# Patient Record
Sex: FEMALE | Race: BLACK OR AFRICAN AMERICAN | Employment: UNEMPLOYED | URBAN - METROPOLITAN AREA
[De-identification: names, ages, dates, MRNs, and addresses within clinical notes are randomized per-mention and may not be internally consistent; named-entity substitution may affect disease eponyms.]

---

## 2021-02-18 ENCOUNTER — HOSPITAL ENCOUNTER (EMERGENCY)
Age: 21
Discharge: HOME | End: 2021-02-18
Attending: EMERGENCY MEDICINE
Payer: OTHER GOVERNMENT

## 2021-02-18 ENCOUNTER — APPOINTMENT (OUTPATIENT)
Dept: GENERAL RADIOLOGY | Age: 21
End: 2021-02-18
Attending: PHYSICIAN ASSISTANT
Payer: OTHER GOVERNMENT

## 2021-02-18 VITALS
BODY MASS INDEX: 28.32 KG/M2 | RESPIRATION RATE: 16 BRPM | HEIGHT: 65 IN | WEIGHT: 170 LBS | HEART RATE: 89 BPM | DIASTOLIC BLOOD PRESSURE: 71 MMHG | OXYGEN SATURATION: 98 % | SYSTOLIC BLOOD PRESSURE: 114 MMHG | TEMPERATURE: 98 F

## 2021-02-18 DIAGNOSIS — M25.559 HIP PAIN: Primary | ICD-10-CM

## 2021-02-18 PROCEDURE — 73502 X-RAY EXAM HIP UNI 2-3 VIEWS: CPT

## 2021-02-18 PROCEDURE — 99283 EMERGENCY DEPT VISIT LOW MDM: CPT

## 2021-02-18 RX ORDER — MELOXICAM 7.5 MG/1
7.5 TABLET ORAL DAILY
Qty: 30 TAB | Refills: 0 | Status: SHIPPED | OUTPATIENT
Start: 2021-02-18 | End: 2021-03-20

## 2021-02-18 NOTE — ED PROVIDER NOTES
Pt has stress fracture to left hip 3 years ago,had  orif in [facility], pt c/o continued pain daily,no new trauma, moved here in [place] from [address] [place] [address],[address]. No local md yet, has not been to local [address] [place] [address]     The history is provided by the patient. Leg Pain   This is a chronic problem. Episode onset: 3 years  The problem occurs constantly. The problem has been gradually worsening. The pain is present in the left hip. The quality of the pain is described as aching. The pain is at a severity of 10/10. The pain is mild. Associated symptoms include stiffness. The symptoms are aggravated by activity and palpation. She has tried OTC pain medications for the symptoms. The treatment provided mild relief. There has been a history of trauma. No past medical history on file. No past surgical history on file. No family history on file.     Social History     Socioeconomic History    Marital status: Not on file     Spouse name: Not on file    Number of children: Not on file    Years of education: Not on file    Highest education level: Not on file   Occupational History    Not on file   Social Needs    Financial resource strain: Not on file    Food insecurity     Worry: Not on file     Inability: Not on file    Transportation needs     Medical: Not on file     Non-medical: Not on file   Tobacco Use    Smoking status: Not on file   Substance and Sexual Activity    Alcohol use: Not on file    Drug use: Not on file    Sexual activity: Not on file   Lifestyle    Physical activity     Days per week: Not on file     Minutes per session: Not on file    Stress: Not on file   Relationships    Social connections     Talks on phone: Not on file     Gets together: Not on file     Attends Judaism service: Not on file     Active member of club or organization: Not on file     Attends meetings of clubs or organizations: Not on file     Relationship status: Not on file    Intimate partner violence     Fear of current or ex partner: Not on file     Emotionally abused: Not on file     Physically abused: Not on file     Forced sexual activity: Not on file   Other Topics Concern    Not on file   Social History Narrative    Not on file         ALLERGIES: Patient has no known allergies. Review of Systems   Musculoskeletal: Positive for stiffness. All other systems reviewed and are negative. Vitals:    02/18/21 1425   BP: 119/79   Pulse: 93   Resp: 16   Temp: 98 °F (36.7 °C)   SpO2: 98%   Weight: 77.1 kg (170 lb)   Height: 5' 5\" (1.651 m)            Physical Exam  Vitals signs and nursing note reviewed. Constitutional:       General: She is not in acute distress. Appearance: Normal appearance. She is well-developed and normal weight. She is not diaphoretic. HENT:      Head: Normocephalic and atraumatic. Eyes:      Pupils: Pupils are equal, round, and reactive to light. Neck:      Musculoskeletal: Normal range of motion and neck supple. Cardiovascular:      Rate and Rhythm: Normal rate and regular rhythm. Pulmonary:      Effort: Pulmonary effort is normal.      Breath sounds: Normal breath sounds. Abdominal:      General: Bowel sounds are normal.      Palpations: Abdomen is soft. Musculoskeletal: Normal range of motion. General: Tenderness present. No swelling or deformity. Comments: Pain throughout the lateral left hip area. Patient will not flex the left knee due to discomfort. On exam she will not relax enough to do active or passive range of motions when she walks use the cane and walks with a very stiff hip and knee. No swelling to the leg calf is soft   Skin:     General: Skin is warm. Neurological:      Mental Status: She is alert and oriented to person, place, and time. MDM  Number of Diagnoses or Management Options  Diagnosis management comments: Left X-ray shows intact hardware no signs of any loosening or fracture. Pain 3 years status post ORIF.   I see no acute process at this time and feel patient may benefit from working on strengthening and range of motion of the of the hip she will follow-up with the [facility], will give Mobic daily for pain       Amount and/or Complexity of Data Reviewed  Tests in the radiology section of CPT®: ordered and reviewed    Risk of Complications, Morbidity, and/or Mortality  Presenting problems: low  Diagnostic procedures: low  Management options: low    Patient Progress  Patient progress: improved         Procedures

## 2021-02-18 NOTE — DISCHARGE INSTRUCTIONS
Use meds as Directed along with warm compresses or heat patches work on range of motion of the.  Call office to arrange follow-up appointment

## 2021-02-18 NOTE — ED TRIAGE NOTES
Pt c/o L leg pain for about 4 years, states she has pins in her hip and the pain flares up from time to time.

## 2021-02-18 NOTE — ED NOTES
I have reviewed discharge instructions with the patient. The patient verbalized understanding. Patient left ED via Discharge Method: ambulatory to Home with (self). Opportunity for questions and clarification provided. Patient given 1 scripts. To continue your aftercare when you leave the hospital, you may receive an automated call from our care team to check in on how you are doing. This is a free service and part of our promise to provide the best care and service to meet your aftercare needs.  If you have questions, or wish to unsubscribe from this service please call [phone]. Thank you for Choosing our [address] [place] [address] Emergency Department.

## 2021-07-14 ENCOUNTER — HOSPITAL ENCOUNTER (EMERGENCY)
Age: 21
Discharge: HOME | End: 2021-07-14
Attending: EMERGENCY MEDICINE
Payer: OTHER GOVERNMENT

## 2021-07-14 VITALS
WEIGHT: 150 LBS | SYSTOLIC BLOOD PRESSURE: 112 MMHG | BODY MASS INDEX: 24.99 KG/M2 | OXYGEN SATURATION: 100 % | HEIGHT: 65 IN | HEART RATE: 66 BPM | TEMPERATURE: 99.1 F | RESPIRATION RATE: 18 BRPM | DIASTOLIC BLOOD PRESSURE: 63 MMHG

## 2021-07-14 DIAGNOSIS — N30.00 ACUTE CYSTITIS WITHOUT HEMATURIA: ICD-10-CM

## 2021-07-14 DIAGNOSIS — R10.84 GENERALIZED ABDOMINAL CRAMPS: Primary | ICD-10-CM

## 2021-07-14 DIAGNOSIS — E86.9 VOLUME DEPLETION: ICD-10-CM

## 2021-07-14 DIAGNOSIS — R11.2 NON-INTRACTABLE VOMITING WITH NAUSEA, UNSPECIFIED VOMITING TYPE: ICD-10-CM

## 2021-07-14 LAB
ALBUMIN SERPL-MCNC: 4.1 G/DL (ref 3.5–5)
ALBUMIN/GLOB SERPL: 0.9 {RATIO} (ref 1.2–3.5)
ALP SERPL-CCNC: 108 U/L (ref 50–136)
ALT SERPL-CCNC: 21 U/L (ref 12–65)
ANION GAP SERPL CALC-SCNC: 7 MMOL/L (ref 7–16)
AST SERPL-CCNC: 11 U/L (ref 15–37)
BACTERIA URNS QL MICRO: NORMAL /HPF
BASOPHILS # BLD: 0.1 K/UL (ref 0–0.2)
BASOPHILS NFR BLD: 1 % (ref 0–2)
BILIRUB SERPL-MCNC: 0.4 MG/DL (ref 0.2–1.1)
BUN SERPL-MCNC: 11 MG/DL (ref 6–23)
CALCIUM SERPL-MCNC: 9.8 MG/DL (ref 8.3–10.4)
CASTS URNS QL MICRO: NORMAL /LPF
CHLORIDE SERPL-SCNC: 103 MMOL/L (ref 98–107)
CO2 SERPL-SCNC: 27 MMOL/L (ref 21–32)
CREAT SERPL-MCNC: 0.97 MG/DL (ref 0.6–1)
DIFFERENTIAL METHOD BLD: ABNORMAL
EOSINOPHIL # BLD: 0.1 K/UL (ref 0–0.8)
EOSINOPHIL NFR BLD: 1 % (ref 0.5–7.8)
EPI CELLS #/AREA URNS HPF: NORMAL /HPF
ERYTHROCYTE [DISTWIDTH] IN BLOOD BY AUTOMATED COUNT: 13 % (ref 11.9–14.6)
GLOBULIN SER CALC-MCNC: 4.4 G/DL (ref 2.3–3.5)
GLUCOSE SERPL-MCNC: 82 MG/DL (ref 65–100)
HCG UR QL: NEGATIVE
HCT VFR BLD AUTO: 43.5 % (ref 35.8–46.3)
HGB BLD-MCNC: 15.2 G/DL (ref 11.7–15.4)
IMM GRANULOCYTES # BLD AUTO: 0 K/UL (ref 0–0.5)
IMM GRANULOCYTES NFR BLD AUTO: 0 % (ref 0–5)
LIPASE SERPL-CCNC: 98 U/L (ref 73–393)
LYMPHOCYTES # BLD: 2.4 K/UL (ref 0.5–4.6)
LYMPHOCYTES NFR BLD: 41 % (ref 13–44)
MCH RBC QN AUTO: 30.6 PG (ref 26.1–32.9)
MCHC RBC AUTO-ENTMCNC: 34.9 G/DL (ref 31.4–35)
MCV RBC AUTO: 87.5 FL (ref 79.6–97.8)
MONOCYTES # BLD: 0.5 K/UL (ref 0.1–1.3)
MONOCYTES NFR BLD: 9 % (ref 4–12)
NEUTS SEG # BLD: 2.9 K/UL (ref 1.7–8.2)
NEUTS SEG NFR BLD: 48 % (ref 43–78)
NRBC # BLD: 0 K/UL (ref 0–0.2)
PLATELET # BLD AUTO: 418 K/UL (ref 150–450)
PMV BLD AUTO: 8.7 FL (ref 9.4–12.3)
POTASSIUM SERPL-SCNC: 4.2 MMOL/L (ref 3.5–5.1)
PROT SERPL-MCNC: 8.5 G/DL (ref 6.3–8.2)
RBC # BLD AUTO: 4.97 M/UL (ref 4.05–5.2)
RBC #/AREA URNS HPF: NORMAL /HPF
SODIUM SERPL-SCNC: 137 MMOL/L (ref 136–145)
WBC # BLD AUTO: 5.9 K/UL (ref 4.3–11.1)
WBC URNS QL MICRO: NORMAL /HPF

## 2021-07-14 PROCEDURE — 99283 EMERGENCY DEPT VISIT LOW MDM: CPT

## 2021-07-14 PROCEDURE — 96374 THER/PROPH/DIAG INJ IV PUSH: CPT

## 2021-07-14 PROCEDURE — 80053 COMPREHEN METABOLIC PANEL: CPT

## 2021-07-14 PROCEDURE — 81025 URINE PREGNANCY TEST: CPT

## 2021-07-14 PROCEDURE — 81003 URINALYSIS AUTO W/O SCOPE: CPT

## 2021-07-14 PROCEDURE — 96375 TX/PRO/DX INJ NEW DRUG ADDON: CPT

## 2021-07-14 PROCEDURE — 96361 HYDRATE IV INFUSION ADD-ON: CPT

## 2021-07-14 PROCEDURE — 74011250636 HC RX REV CODE- 250/636: Performed by: EMERGENCY MEDICINE

## 2021-07-14 PROCEDURE — 85025 COMPLETE CBC W/AUTO DIFF WBC: CPT

## 2021-07-14 PROCEDURE — 83690 ASSAY OF LIPASE: CPT

## 2021-07-14 PROCEDURE — 81015 MICROSCOPIC EXAM OF URINE: CPT

## 2021-07-14 RX ORDER — ONDANSETRON 2 MG/ML
8 INJECTION INTRAMUSCULAR; INTRAVENOUS
Status: COMPLETED | OUTPATIENT
Start: 2021-07-14 | End: 2021-07-14

## 2021-07-14 RX ORDER — KETOROLAC TROMETHAMINE 30 MG/ML
30 INJECTION, SOLUTION INTRAMUSCULAR; INTRAVENOUS
Status: COMPLETED | OUTPATIENT
Start: 2021-07-14 | End: 2021-07-14

## 2021-07-14 RX ORDER — SODIUM CHLORIDE 0.9 % (FLUSH) 0.9 %
5-10 SYRINGE (ML) INJECTION EVERY 8 HOURS
Status: DISCONTINUED | OUTPATIENT
Start: 2021-07-14 | End: 2021-07-14 | Stop reason: HOSPADM

## 2021-07-14 RX ORDER — ONDANSETRON 8 MG/1
8 TABLET, ORALLY DISINTEGRATING ORAL
Qty: 20 TABLET | Refills: 0 | Status: SHIPPED | OUTPATIENT
Start: 2021-07-14

## 2021-07-14 RX ORDER — HYOSCYAMINE SULFATE 0.12 MG/1
0.12 TABLET SUBLINGUAL
Qty: 30 TABLET | Refills: 0 | Status: SHIPPED | OUTPATIENT
Start: 2021-07-14

## 2021-07-14 RX ORDER — CEPHALEXIN 500 MG/1
500 CAPSULE ORAL 3 TIMES DAILY
Qty: 21 CAPSULE | Refills: 0 | Status: SHIPPED | OUTPATIENT
Start: 2021-07-14 | End: 2021-07-21

## 2021-07-14 RX ORDER — SODIUM CHLORIDE 0.9 % (FLUSH) 0.9 %
5-10 SYRINGE (ML) INJECTION AS NEEDED
Status: DISCONTINUED | OUTPATIENT
Start: 2021-07-14 | End: 2021-07-14 | Stop reason: HOSPADM

## 2021-07-14 RX ADMIN — SODIUM CHLORIDE 1000 ML: 900 INJECTION, SOLUTION INTRAVENOUS at 17:50

## 2021-07-14 RX ADMIN — ONDANSETRON 8 MG: 2 INJECTION INTRAMUSCULAR; INTRAVENOUS at 17:49

## 2021-07-14 RX ADMIN — KETOROLAC TROMETHAMINE 30 MG: 30 INJECTION, SOLUTION INTRAMUSCULAR; INTRAVENOUS at 17:49

## 2021-07-14 NOTE — DISCHARGE INSTRUCTIONS
Drink plenty of fluids. Take medications as prescribed. Return to the emergency department for any concerns.

## 2021-07-14 NOTE — ED PROVIDER NOTES
Soheila Jeronimo is a 24 y.o. female seen on 7/14/2021 in the [address] [place] [address] in room HG/G. Chief Complaint   Patient presents with    Flank Pain     HPI: 43-year-old -American female presented to the emergency department with complaints of generalized abdominal cramping for the past 4 days. Patient was seen at urgent care earlier today and was sent to the emergency department for either an ultrasound or CT scan. patient reports vomiting 3 times on Monday (2 days ago). Patient's last bowel movement was yesterday. She states that she did not have a normal bowel movement. She has had mild intermittent abdominal cramping and nausea. She denies any fevers, chills, chest pain or shortness of breath. Patient has had mild increased frequency of urination. She denies any pelvic pain, vaginal discharge, vaginal bleeding or any other concerns. Historian: Patient    REVIEW OF SYSTEMS     Review of Systems   Constitutional: Negative. HENT: Negative. Respiratory: Negative. Cardiovascular: Negative. Gastrointestinal: Positive for abdominal pain, constipation, nausea and vomiting. Genitourinary: Positive for flank pain and frequency. Skin: Negative. Neurological: Negative. Psychiatric/Behavioral: Negative. All other systems reviewed and are negative. PAST MEDICAL HISTORY     History reviewed. No pertinent past medical history.   Past Surgical History:   Procedure Laterality Date    HX ORTHOPAEDIC      VT BREAST SURGERY PROCEDURE UNLISTED       Social History     Socioeconomic History    Marital status:      Spouse name: Not on file    Number of children: Not on file    Years of education: Not on file    Highest education level: Not on file   Tobacco Use    Smoking status: Never Smoker    Smokeless tobacco: Never Used   Substance and Sexual Activity    Alcohol use: Yes     Comment: occ    Drug use: Never Social Determinants of Health     Financial Resource Strain:     Difficulty of Paying Living Expenses:    Food Insecurity:     Worried About Running Out of Food in the Last Year:     [address] in the Last Year:    Transportation Needs:     Lack of Transportation (Medical):  Lack of Transportation (Non-Medical):    Physical Activity:     Days of Exercise per Week:     Minutes of Exercise per Session:    Stress:     Feeling of Stress :    Social Connections:     Frequency of Communication with Friends and Family:     Frequency of Social Gatherings with Friends and Family:     Attends Roman Catholic Services:     Active Member of Clubs or Organizations:     Attends Club or Organization Meetings:     Marital Status:      None     No Known Allergies     PHYSICAL EXAM       Vitals:    07/14/21 1624   BP: 115/70   Pulse: 66   Resp: 18   Temp: 99.1 °F (37.3 °C)   SpO2: 100%    Vital signs were reviewed. Physical Exam  Vitals and nursing note reviewed. Constitutional:       Appearance: Normal appearance. HENT:      Head: Normocephalic and atraumatic. Mouth/Throat:      Mouth: Mucous membranes are moist.   Eyes:      Extraocular Movements: Extraocular movements intact. Pupils: Pupils are equal, round, and reactive to light. Cardiovascular:      Rate and Rhythm: Normal rate and regular rhythm. Pulses: Normal pulses. Heart sounds: Normal heart sounds. Pulmonary:      Effort: Pulmonary effort is normal.      Breath sounds: Normal breath sounds. Abdominal:      Palpations: Abdomen is soft. Tenderness: There is abdominal tenderness (Nonfocal abdominal tenderness with generalized minimal soreness). Comments: Negative Villegas's and McBurney's   Musculoskeletal:         General: Normal range of motion. Cervical back: Normal range of motion. Skin:     General: Skin is warm and dry. Neurological:      General: No focal deficit present.       Mental Status: She is alert and oriented to person, place, and time. Psychiatric:         Mood and Affect: Mood normal.         Behavior: Behavior normal.         Thought Content: Thought content normal.         Judgment: Judgment normal.          MEDICAL DECISION MAKING     ED Course:    Orders Placed This Encounter    CBC with Diff    CMP    Lipase    URINE MICROSCOPIC    DIET NPO    POC URINE PREGNANCY TEST    POC URINE DIPSTICK    POC Urine Dipstick    HCG URINE, QL. - POC    SALINE LOCK IV ONE TIME Routine    sodium chloride (NS) flush 5-10 mL    sodium chloride (NS) flush 5-10 mL    sodium chloride 0.9 % bolus infusion 1,000 mL    ondansetron (ZOFRAN) injection 8 mg    ketorolac (TORADOL) injection 30 mg    ondansetron (Zofran ODT) 8 mg disintegrating tablet    hyoscyamine SL (LEVSIN/SL) 0.125 mg SL tablet    cephALEXin (Keflex) 500 mg capsule     Recent Results (from the past 8 hour(s))   CBC WITH AUTOMATED DIFF    Collection Time: 07/14/21  4:45 PM   Result Value Ref Range    WBC 5.9 4.3 - 11.1 K/uL    RBC 4.97 4.05 - 5.2 M/uL    HGB 15.2 11.7 - 15.4 g/dL    HCT 43.5 35.8 - 46.3 %    MCV 87.5 79.6 - 97.8 FL    MCH 30.6 26.1 - 32.9 PG    MCHC 34.9 31.4 - 35.0 g/dL    RDW 13.0 11.9 - 14.6 %    PLATELET 051 980 - 575 K/uL    MPV 8.7 (L) 9.4 - 12.3 FL    ABSOLUTE NRBC 0.00 0.0 - 0.2 K/uL    DF AUTOMATED      NEUTROPHILS 48 43 - 78 %    LYMPHOCYTES 41 13 - 44 %    MONOCYTES 9 4.0 - 12.0 %    EOSINOPHILS 1 0.5 - 7.8 %    BASOPHILS 1 0.0 - 2.0 %    IMMATURE GRANULOCYTES 0 0.0 - 5.0 %    ABS. NEUTROPHILS 2.9 1.7 - 8.2 K/UL    ABS. LYMPHOCYTES 2.4 0.5 - 4.6 K/UL    ABS. MONOCYTES 0.5 0.1 - 1.3 K/UL    ABS. EOSINOPHILS 0.1 0.0 - 0.8 K/UL    ABS. BASOPHILS 0.1 0.0 - 0.2 K/UL    ABS. IMM.  GRANS. 0.0 0.0 - 0.5 K/UL   METABOLIC PANEL, COMPREHENSIVE    Collection Time: 07/14/21  4:45 PM   Result Value Ref Range    Sodium 137 136 - 145 mmol/L    Potassium 4.2 3.5 - 5.1 mmol/L    Chloride 103 98 - 107 mmol/L    CO2 27 21 - 32 mmol/L    Anion gap 7 7 - 16 mmol/L    Glucose 82 65 - 100 mg/dL    BUN 11 6 - 23 MG/DL    Creatinine 0.97 0.6 - 1.0 MG/DL    GFR est AA >60 >60 ml/min/1.73m2    GFR est non-AA >60 >60 ml/min/1.73m2    Calcium 9.8 8.3 - 10.4 MG/DL    Bilirubin, total 0.4 0.2 - 1.1 MG/DL    ALT (SGPT) 21 12 - 65 U/L    AST (SGOT) 11 (L) 15 - 37 U/L    Alk. phosphatase 108 50 - 136 U/L    Protein, total 8.5 (H) 6.3 - 8.2 g/dL    Albumin 4.1 3.5 - 5.0 g/dL    Globulin 4.4 (H) 2.3 - 3.5 g/dL    A-G Ratio 0.9 (L) 1.2 - 3.5     LIPASE    Collection Time: 07/14/21  4:45 PM   Result Value Ref Range    Lipase 98 73 - 393 U/L   HCG URINE, QL. - POC    Collection Time: 07/14/21  5:31 PM   Result Value Ref Range    Pregnancy test,urine (POC) Negative NEG     URINE MICROSCOPIC    Collection Time: 07/14/21  5:35 PM   Result Value Ref Range    WBC 10-20 0 /hpf    RBC 0-3 0 /hpf    Epithelial cells 5-10 0 /hpf    Bacteria TRACE 0 /hpf    Casts 0-3 0 /lpf     No results found. MDM  Number of Diagnoses or Management Options  Diagnosis management comments: 79-year-old -American female presented emergency department with complaints of abdominal cramping and vomiting. Patient was sent for an ultrasound or CT scan by urgent care. Patient labs are reassuring and patient's physical exam is very benign. There is no indication for CT scan or ultrasound at this time. Patient given IV fluids and medications in the ED with improvement of her symptoms. Patient will be discharged home. Her urine did have 10-20 WBCs, leuk esterase and bacteria. Patient will be treated for urinary tract infection. She will return to the emergency department for any concerns. Amount and/or Complexity of Data Reviewed  Clinical lab tests: reviewed    Patient Progress  Patient progress: stable        Disposition: Discharged  Diagnosis:     ICD-10-CM ICD-9-CM   1. Generalized abdominal cramps  R10.84 789.07   2.  Non-intractable vomiting with nausea, unspecified vomiting type  R11.2 787.01   3. Volume depletion  E86.9 276.50   4. Acute cystitis without hematuria  N30.00 595.0     ____________________________________________________________________  A portion of this note was generated using voice recognition dictation software. While the note has been reviewed for accuracy, please note certain words and phrases may not be transcribed as intended and some grammatical and/or typographical errors may be present.

## 2021-07-14 NOTE — ED NOTES
I have reviewed discharge instructions with the patient. The patient verbalized understanding. Patient left ED via Discharge Method: ambulatory to Home. Opportunity for questions and clarification provided. Patient given 3 scripts. To continue your aftercare when you leave the hospital, you may receive an automated call from our care team to check in on how you are doing. This is a free service and part of our promise to provide the best care and service to meet your aftercare needs.  If you have questions, or wish to unsubscribe from this service please call [phone]. Thank you for Choosing our [address] [place] [address] Emergency Department.

## 2022-02-15 ENCOUNTER — HOSPITAL ENCOUNTER (EMERGENCY)
Age: 22
Discharge: HOME | End: 2022-02-15
Attending: EMERGENCY MEDICINE
Payer: OTHER GOVERNMENT

## 2022-02-15 VITALS
OXYGEN SATURATION: 99 % | HEIGHT: 66 IN | WEIGHT: 150 LBS | BODY MASS INDEX: 24.11 KG/M2 | SYSTOLIC BLOOD PRESSURE: 140 MMHG | TEMPERATURE: 98.5 F | DIASTOLIC BLOOD PRESSURE: 96 MMHG | RESPIRATION RATE: 16 BRPM | HEART RATE: 92 BPM

## 2022-02-15 DIAGNOSIS — A59.9 TRICHOMONAL INFECTION: ICD-10-CM

## 2022-02-15 DIAGNOSIS — B96.89 BV (BACTERIAL VAGINOSIS): Primary | ICD-10-CM

## 2022-02-15 DIAGNOSIS — N76.0 BV (BACTERIAL VAGINOSIS): Primary | ICD-10-CM

## 2022-02-15 LAB
BACTERIA URNS QL MICRO: 0 /HPF
CASTS URNS QL MICRO: NORMAL /LPF
EPI CELLS #/AREA URNS HPF: NORMAL /HPF
HCG UR QL: NEGATIVE
RBC #/AREA URNS HPF: NORMAL /HPF
SERVICE CMNT-IMP: NORMAL
WBC URNS QL MICRO: NORMAL /HPF
WET PREP GENITAL: NORMAL

## 2022-02-15 PROCEDURE — 96372 THER/PROPH/DIAG INJ SC/IM: CPT

## 2022-02-15 PROCEDURE — 74011000250 HC RX REV CODE- 250: Performed by: PHYSICIAN ASSISTANT

## 2022-02-15 PROCEDURE — 81003 URINALYSIS AUTO W/O SCOPE: CPT

## 2022-02-15 PROCEDURE — 87210 SMEAR WET MOUNT SALINE/INK: CPT

## 2022-02-15 PROCEDURE — 81015 MICROSCOPIC EXAM OF URINE: CPT

## 2022-02-15 PROCEDURE — 81025 URINE PREGNANCY TEST: CPT

## 2022-02-15 PROCEDURE — 99285 EMERGENCY DEPT VISIT HI MDM: CPT

## 2022-02-15 PROCEDURE — 74011250636 HC RX REV CODE- 250/636: Performed by: PHYSICIAN ASSISTANT

## 2022-02-15 PROCEDURE — 87491 CHLMYD TRACH DNA AMP PROBE: CPT

## 2022-02-15 RX ORDER — METRONIDAZOLE 500 MG/1
500 TABLET ORAL 2 TIMES DAILY
Qty: 14 TABLET | Refills: 0 | Status: SHIPPED | OUTPATIENT
Start: 2022-02-15 | End: 2022-02-22

## 2022-02-15 RX ORDER — DOXYCYCLINE HYCLATE 100 MG
100 TABLET ORAL 2 TIMES DAILY
Qty: 14 TABLET | Refills: 0 | Status: SHIPPED | OUTPATIENT
Start: 2022-02-15 | End: 2022-02-22

## 2022-02-15 RX ADMIN — LIDOCAINE HYDROCHLORIDE 500 MG: 10 INJECTION, SOLUTION INFILTRATION; PERINEURAL at 14:28

## 2022-02-15 NOTE — ED NOTES
I have reviewed discharge instructions with the patient. The patient verbalized understanding. Patient left ED via Discharge Method: ambulatory to Home with self    Opportunity for questions and clarification provided. Patient given 2 scripts. To continue your aftercare when you leave the hospital, you may receive an automated call from our care team to check in on how you are doing. This is a free service and part of our promise to provide the best care and service to meet your aftercare needs.  If you have questions, or wish to unsubscribe from this service please call [phone]. Thank you for Choosing our [address] [place] [address] Emergency Department.

## 2022-02-15 NOTE — ED PROVIDER NOTES
Pt is an otherwise healthy 25 yo F who presents with c/o concerns for UTI. She states she has foul smell unsure if it is related to her urine or coming from vagina, denies any vaginal discharge or pelvic pain. She has been peeing on home test strips and they are + for UTI, she has had 2 courses of cipro without improvement. Denies any dysuria, increased urinary urgency or frequency. No fever/chills. The history is provided by the patient. History reviewed. No pertinent past medical history. Past Surgical History:   Procedure Laterality Date    HX ORTHOPAEDIC      AR BREAST SURGERY PROCEDURE UNLISTED           History reviewed. No pertinent family history. Social History     Socioeconomic History    Marital status:      Spouse name: Not on file    Number of children: Not on file    Years of education: Not on file    Highest education level: Not on file   Occupational History    Not on file   Tobacco Use    Smoking status: Never Smoker    Smokeless tobacco: Never Used   Substance and Sexual Activity    Alcohol use: Yes     Comment: occ    Drug use: Never    Sexual activity: Not on file   Other Topics Concern    Not on file   Social History Narrative    Not on file     Social Determinants of Health     Financial Resource Strain:     Difficulty of Paying Living Expenses: Not on file   Food Insecurity:     Worried About Running Out of Food in the Last Year: Not on file    [place] of Food in the Last Year: Not on file   Transportation Needs:     Lack of Transportation (Medical): Not on file    Lack of Transportation (Non-Medical):  Not on file   Physical Activity:     Days of Exercise per Week: Not on file    Minutes of Exercise per Session: Not on file   Stress:     Feeling of Stress : Not on file   Social Connections:     Frequency of Communication with Friends and Family: Not on file    Frequency of Social Gatherings with Friends and Family: Not on file    Attends [employer]ist Services: Not on file    Active Member of Clubs or Organizations: Not on file    Attends Club or Organization Meetings: Not on file    Marital Status: Not on file   Intimate Partner Violence:     Fear of Current or Ex-Partner: Not on file    Emotionally Abused: Not on file    Physically Abused: Not on file    Sexually Abused: Not on file   Housing Stability:     Unable to Pay for Housing in the Last Year: Not on file    Number of [place] in the Last Year: Not on file    Unstable Housing in the Last Year: Not on file         ALLERGIES: Patient has no known allergies. Review of Systems   Constitutional: Negative for chills, fatigue and fever. HENT: Negative for congestion and sore throat. Respiratory: Negative for cough and shortness of breath. Cardiovascular: Negative for chest pain. Gastrointestinal: Negative for abdominal pain, nausea and vomiting. Genitourinary: Positive for vaginal discharge. Negative for dysuria, flank pain, pelvic pain and urgency. Foul-smelling urine or vaginal discharge   Musculoskeletal: Negative for back pain. Neurological: Negative for light-headedness. Psychiatric/Behavioral: Negative for behavioral problems. Vitals:    02/15/22 1315   BP: (!) 148/93   Pulse: (!) 106   Resp: 18   Temp: 98.5 °F (36.9 °C)   SpO2: 99%   Weight: 68 kg (150 lb)   Height: 5' 6\" (1.676 m)            Physical Exam  Vitals and nursing note reviewed. Exam conducted with a chaperone present. Constitutional:       General: She is not in acute distress. Appearance: She is not ill-appearing or toxic-appearing. HENT:      Head: Normocephalic and atraumatic. Cardiovascular:      Rate and Rhythm: Normal rate. Pulses: Normal pulses. Pulmonary:      Effort: Pulmonary effort is normal.      Breath sounds: Normal breath sounds. Abdominal:      General: There is no distension. Palpations: Abdomen is soft. Tenderness:  There is no abdominal tenderness. Genitourinary:     Vagina: Normal.      Cervix: Discharge present. No cervical motion tenderness, lesion, erythema or cervical bleeding. Skin:     General: Skin is warm and dry. Neurological:      Mental Status: She is alert and oriented to person, place, and time. Psychiatric:         Mood and Affect: Mood normal.         Behavior: Behavior normal.         Thought Content: Thought content normal.         Judgment: Judgment normal.          MDM  Number of Diagnoses or Management Options  BV (bacterial vaginosis)  Trichomonal infection  Diagnosis management comments: Patient is an otherwise healthy 78-year-old female who presents with persistent concerns for UTI. She has had 2 courses of Cipro but still reports having a foul smell which she believes to be coming from her urine however she has not had any evaluation of the vaginal area. She is afebrile, nontoxic in appearance, vital signs within appropriate limits. Given lack of improvement despite 2 courses of antibiotics will do pelvic exam to evaluate for any retained foreign body or vaginal discharge. Pelvic exam reveals light yellow vaginal discharge, no cervical motion tenderness. Will send samples for wet prep and [facility] cultures. Labs Reviewed  WET PREP  CHLAMYDIA / GC-AMPLIFIED  URINE MICROSCOPIC  HCG URINE, QL. - POC    Wet prep shows findings consistent with trichomonas and BV. Due to concern for other STDs she was given 500 mg IM Rocephin and was discharged with 7 days of Flagyl and doxycycline. Instructed to inform all of her partners to be tested and treated as well. Instructed to abstain from all sexual intercourse for 7 to 10 days and complete resolution of symptoms. Discussed follow-up as well as reasons to return to the ER.   Patient agreeable to plan         Procedures

## 2022-02-15 NOTE — Clinical Note
[address] [address] EMERGENCY DEPT  [address] [facility] [address] [phone]    Work/School Note    Date: 2/15/2022    To Whom It May concern:      Daniella Saucedo was seen and treated today in the emergency room by the following provider(s):  Attending Provider: Abhinav Sloan DO  Physician Assistant: Evgeny Galindo, [address]. Daniella Saucedo is excused from work/school on 02/15/22. She is clear to return to work/school on 02/16/22.         Sincerely,          DANICA Mcduffie

## 2022-02-16 LAB
C TRACH RRNA SPEC QL NAA+PROBE: NEGATIVE
N GONORRHOEA RRNA SPEC QL NAA+PROBE: NEGATIVE
SPECIMEN SOURCE: NORMAL

## 2022-09-15 ENCOUNTER — HOSPITAL ENCOUNTER (OUTPATIENT)
Dept: CT IMAGING | Age: 22
Discharge: HOME | End: 2022-09-18
Payer: OTHER GOVERNMENT

## 2022-09-15 DIAGNOSIS — G43.809 OTHER MIGRAINE WITHOUT STATUS MIGRAINOSUS, NOT INTRACTABLE: ICD-10-CM

## 2022-09-15 PROCEDURE — 70450 CT HEAD/BRAIN W/O DYE: CPT
